# Patient Record
Sex: MALE | Race: WHITE | HISPANIC OR LATINO | Employment: OTHER | URBAN - METROPOLITAN AREA
[De-identification: names, ages, dates, MRNs, and addresses within clinical notes are randomized per-mention and may not be internally consistent; named-entity substitution may affect disease eponyms.]

---

## 2017-02-16 ENCOUNTER — TELEPHONE (OUTPATIENT)
Dept: FAMILY MEDICINE | Facility: CLINIC | Age: 77
End: 2017-02-16

## 2017-02-16 DIAGNOSIS — C78.7 CHOLANGIOCARCINOMA METASTATIC TO LIVER: Primary | ICD-10-CM

## 2017-02-16 DIAGNOSIS — C22.1 CHOLANGIOCARCINOMA METASTATIC TO LIVER: Primary | ICD-10-CM

## 2017-02-16 DIAGNOSIS — C22.1 CHOLANGIOCARCINOMA OF BILIARY TRACT: ICD-10-CM

## 2017-02-16 NOTE — TELEPHONE ENCOUNTER
----- Message from Emiliano Messer sent at 2/15/2017  2:57 PM CST -----  Contact: Adriana/Daughter/[phone]  She is calling to see about setting him up for hospice.

## 2017-02-16 NOTE — TELEPHONE ENCOUNTER
Spoke to patients daughter Adriana and she believes that the patient is ready to be put in hospice he is getting weaker not eating does not want any more health care with his cancer. Please advise.